# Patient Record
Sex: MALE | URBAN - METROPOLITAN AREA
[De-identification: names, ages, dates, MRNs, and addresses within clinical notes are randomized per-mention and may not be internally consistent; named-entity substitution may affect disease eponyms.]

---

## 2022-01-23 ENCOUNTER — TELEPHONE (OUTPATIENT)
Dept: NURSING | Facility: CLINIC | Age: 42
End: 2022-01-23

## 2022-01-23 NOTE — TELEPHONE ENCOUNTER
Telephone Call:     Tested positive for COVID-19 on Monday   Headache    Pt is calling to request the isolation guidelines recommended for when he can go back to work:     How can I protect others?    If you have symptoms: stay home and away from others (self-isolate) until:    You've had no fever--and no medicine that reduces fever--for 1 full day (24 hours). And       Your other symptoms have gotten better. For example, your cough or breathing has improved. And     At least 10 days have passed since your symptoms started. (If you've been told by a doctor that you have a weak immune system, wait 20 days.)     If you don't have symptoms: Stay home and away from others (self-isolate) until at least 10 days have passed since your first positive COVID-19 test. (Date test collected)    Pt declined triage of sx.   Pt was also given the COVID testing line number to use in the future for covid testing.     Gabby Bassett RN  [facility] Nurse Advisor 2:53 PM 1/23/2022